# Patient Record
Sex: MALE | Race: WHITE | URBAN - METROPOLITAN AREA
[De-identification: names, ages, dates, MRNs, and addresses within clinical notes are randomized per-mention and may not be internally consistent; named-entity substitution may affect disease eponyms.]

---

## 2019-07-02 ENCOUNTER — OFFICE VISIT CONVERTED (OUTPATIENT)
Dept: OTHER | Facility: HOSPITAL | Age: 24
End: 2019-07-02
Attending: NURSE PRACTITIONER

## 2019-07-02 ENCOUNTER — CONVERSION ENCOUNTER (OUTPATIENT)
Dept: OTHER | Facility: HOSPITAL | Age: 24
End: 2019-07-02

## 2019-07-15 ENCOUNTER — HOSPITAL ENCOUNTER (OUTPATIENT)
Dept: OTHER | Facility: HOSPITAL | Age: 24
Discharge: HOME | End: 2019-07-15

## 2019-07-15 ENCOUNTER — OFFICE VISIT CONVERTED (OUTPATIENT)
Dept: OTHER | Facility: HOSPITAL | Age: 24
End: 2019-07-15
Attending: NURSE PRACTITIONER

## 2019-07-15 LAB
ALBUMIN SERPL-MCNC: 4.5 G/DL (ref 3.5–5)
ALP SERPL-CCNC: 101 U/L (ref 53–128)
ALT SERPL-CCNC: 135 U/L (ref 10–40)
ANION GAP SERPL CALC-SCNC: 22 MMOL/L (ref 8–19)
AST SERPL-CCNC: 95 U/L (ref 15–50)
BASOPHILS # BLD AUTO: 0.05 10*3/UL (ref 0–0.2)
BASOPHILS NFR BLD AUTO: 0.9 % (ref 0–3)
BILIRUB SERPL-MCNC: 0.51 MG/DL (ref 0.2–1.3)
BUN SERPL-MCNC: 13 MG/DL (ref 5–25)
BUN/CREAT SERPL: 14 {RATIO} (ref 6–20)
CALCIUM SERPL-MCNC: 9.3 MG/DL (ref 8.7–10.4)
CHLORIDE SERPL-SCNC: 101 MMOL/L (ref 99–111)
CONV ABS IMM GRAN: 0.02 10*3/UL (ref 0–0.2)
CONV BILI, CONJUGATED: <0.2 MG/DL (ref 0–0.6)
CONV CO2: 21 MMOL/L (ref 22–32)
CONV IMMATURE GRAN: 0.4 % (ref 0–1.8)
CONV TOTAL PROTEIN: 7.7 G/DL (ref 6.3–8.2)
CONV UNCONJUGATED BILIRUBIN: 0.3 MG/DL (ref 0–1.1)
CREAT UR-MCNC: 0.9 MG/DL (ref 0.7–1.2)
DEPRECATED RDW RBC AUTO: 42.2 FL (ref 35.1–43.9)
EOSINOPHIL # BLD AUTO: 0.05 10*3/UL (ref 0–0.7)
EOSINOPHIL # BLD AUTO: 0.9 % (ref 0–7)
ERYTHROCYTE [DISTWIDTH] IN BLOOD BY AUTOMATED COUNT: 12.9 % (ref 11.6–14.4)
FOLATE SERPL-MCNC: 18.5 NG/ML (ref 4.8–20)
GFR SERPLBLD BASED ON 1.73 SQ M-ARVRAT: >60 ML/MIN/{1.73_M2}
GGT SERPL-CCNC: 21 U/L (ref 8–78)
GLUCOSE SERPL-MCNC: 76 MG/DL (ref 70–99)
HBA1C MFR BLD: 14.1 G/DL (ref 14–18)
HCT VFR BLD AUTO: 42 % (ref 42–52)
LYMPHOCYTES # BLD AUTO: 2.08 10*3/UL (ref 1–5)
MCH RBC QN AUTO: 29.8 PG (ref 27–31)
MCHC RBC AUTO-ENTMCNC: 33.6 G/DL (ref 33–37)
MCV RBC AUTO: 88.8 FL (ref 80–96)
MONOCYTES # BLD AUTO: 0.57 10*3/UL (ref 0.2–1.2)
MONOCYTES NFR BLD AUTO: 10.6 % (ref 3–10)
NEUTROPHILS # BLD AUTO: 2.63 10*3/UL (ref 2–8)
NEUTROPHILS NFR BLD AUTO: 48.7 % (ref 30–85)
NRBC CBCN: 0 % (ref 0–0.7)
OSMOLALITY SERPL CALC.SUM OF ELEC: 289 MOSM/KG (ref 273–304)
PLATELET # BLD AUTO: 160 10*3/UL (ref 130–400)
PMV BLD AUTO: 10.2 FL (ref 9.4–12.4)
POTASSIUM SERPL-SCNC: 4 MMOL/L (ref 3.5–5.3)
RBC # BLD AUTO: 4.73 10*6/UL (ref 4.7–6.1)
SODIUM SERPL-SCNC: 140 MMOL/L (ref 135–147)
VALPROATE SERPL-MCNC: 69.6 UG/ML (ref 50–100)
VARIANT LYMPHS NFR BLD MANUAL: 38.5 % (ref 20–45)
VIT B12 SERPL-MCNC: 768 PG/ML (ref 211–911)
WBC # BLD AUTO: 5.4 10*3/UL (ref 4.8–10.8)

## 2019-07-17 LAB
CONV HEPATITIS B SURFACE AG W CONFIRMATION RE: NEGATIVE
HAV IGM SERPL QL IA: NEGATIVE
HBV CORE IGM SERPL QL IA: NEGATIVE
HCV AB SER DONR QL: <0.1 S/CO RATIO (ref 0–0.9)

## 2019-08-13 ENCOUNTER — OFFICE VISIT CONVERTED (OUTPATIENT)
Dept: OTHER | Facility: HOSPITAL | Age: 24
End: 2019-08-13
Attending: NURSE PRACTITIONER

## 2019-08-13 ENCOUNTER — CONVERSION ENCOUNTER (OUTPATIENT)
Dept: OTHER | Facility: HOSPITAL | Age: 24
End: 2019-08-13

## 2021-05-15 VITALS
RESPIRATION RATE: 18 BRPM | WEIGHT: 233 LBS | HEART RATE: 62 BPM | OXYGEN SATURATION: 96 % | DIASTOLIC BLOOD PRESSURE: 66 MMHG | BODY MASS INDEX: 37.45 KG/M2 | SYSTOLIC BLOOD PRESSURE: 110 MMHG | TEMPERATURE: 96.9 F | HEIGHT: 66 IN

## 2021-05-15 VITALS
OXYGEN SATURATION: 91 % | DIASTOLIC BLOOD PRESSURE: 74 MMHG | SYSTOLIC BLOOD PRESSURE: 102 MMHG | HEIGHT: 66 IN | TEMPERATURE: 96.2 F | BODY MASS INDEX: 37.04 KG/M2 | RESPIRATION RATE: 20 BRPM | WEIGHT: 230.5 LBS | HEART RATE: 67 BPM

## 2021-05-15 VITALS
BODY MASS INDEX: 37.93 KG/M2 | HEIGHT: 66 IN | RESPIRATION RATE: 18 BRPM | SYSTOLIC BLOOD PRESSURE: 110 MMHG | WEIGHT: 236 LBS | OXYGEN SATURATION: 96 % | HEART RATE: 88 BPM | TEMPERATURE: 97 F | DIASTOLIC BLOOD PRESSURE: 68 MMHG

## 2025-04-17 ENCOUNTER — HOSPITAL ENCOUNTER (EMERGENCY)
Facility: HOSPITAL | Age: 30
Discharge: HOME | End: 2025-04-17
Attending: EMERGENCY MEDICINE
Payer: MEDICAID

## 2025-04-17 ENCOUNTER — APPOINTMENT (OUTPATIENT)
Dept: CT IMAGING | Facility: HOSPITAL | Age: 30
End: 2025-04-17
Payer: MEDICAID

## 2025-04-17 VITALS
HEIGHT: 66 IN | TEMPERATURE: 98.2 F | DIASTOLIC BLOOD PRESSURE: 82 MMHG | HEART RATE: 88 BPM | RESPIRATION RATE: 20 BRPM | BODY MASS INDEX: 48.33 KG/M2 | SYSTOLIC BLOOD PRESSURE: 134 MMHG | WEIGHT: 300.71 LBS | OXYGEN SATURATION: 99 %
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
## 2025-04-17 DIAGNOSIS — S09.93XA FACIAL TRAUMA, INITIAL ENCOUNTER: Primary | ICD-10-CM

## 2025-04-17 DIAGNOSIS — R04.0 EPISTAXIS DUE TO TRAUMA: ICD-10-CM

## 2025-04-17 DIAGNOSIS — R22.0 NASAL SWELLING: ICD-10-CM

## 2025-04-17 PROCEDURE — 70486 CT MAXILLOFACIAL W/O DYE: CPT

## 2025-04-17 PROCEDURE — 99284 EMERGENCY DEPT VISIT MOD MDM: CPT

## 2025-04-17 PROCEDURE — 70450 CT HEAD/BRAIN W/O DYE: CPT

## 2025-04-17 RX ORDER — PROPRANOLOL HYDROCHLORIDE 10 MG/1
TABLET ORAL
COMMUNITY
Start: 2025-03-24

## 2025-04-17 RX ORDER — CALCIUM CARBONATE 500(1250)
TABLET ORAL
COMMUNITY
Start: 2025-03-24

## 2025-04-17 RX ORDER — QUETIAPINE FUMARATE 200 MG/1
TABLET, FILM COATED ORAL
COMMUNITY
Start: 2025-02-25

## 2025-04-17 RX ORDER — PALIPERIDONE 9 MG/1
TABLET, EXTENDED RELEASE ORAL
COMMUNITY
Start: 2025-03-24

## 2025-04-17 RX ORDER — FLUOXETINE HYDROCHLORIDE 40 MG/1
CAPSULE ORAL
COMMUNITY

## 2025-04-17 RX ORDER — CLONIDINE HYDROCHLORIDE 0.1 MG/1
TABLET ORAL
COMMUNITY
Start: 2025-03-24

## 2025-04-17 RX ORDER — DIVALPROEX SODIUM 500 MG/1
TABLET, FILM COATED, EXTENDED RELEASE ORAL
COMMUNITY
Start: 2025-03-24

## 2025-04-17 RX ORDER — QUETIAPINE FUMARATE 100 MG/1
TABLET, FILM COATED ORAL
COMMUNITY

## 2025-04-17 RX ORDER — DOCUSATE SODIUM 100 MG
CAPSULE ORAL
COMMUNITY
Start: 2025-03-24

## 2025-04-17 RX ORDER — DIAZEPAM 5 MG/1
TABLET ORAL
COMMUNITY
Start: 2025-04-07

## 2025-04-17 RX ORDER — BUSPIRONE HYDROCHLORIDE 30 MG/1
TABLET ORAL
COMMUNITY
Start: 2025-03-24

## 2025-04-17 NOTE — ED PROVIDER NOTES
Time: 6:53 PM EDT  Date of encounter:  4/17/2025  Independent Historian/Clinical History and Information was obtained by:   Patient and Family    History is limited by: N/A    Chief Complaint   Patient presents with    Fall    Facial Injury         History of Present Illness:  Patient is a 29 y.o. year old male who presents to the emergency department for evaluation of injury after a fall that occurred today.  Family reports patient tripped while entering the living room, was not able to brace himself and hit his face when he fell.  They report patient has history of of autism and is nonverbal.  Patient has swelling and abrasions to the nose.  Family denies loss of consciousness.  Patient appears to be in no obvious distress in triage.  (PRASANTH Haines, provider in triage)     On exam he does have dried blood bilaterally.  The bleeding has stopped.  It appears that he had bilateral epistaxis.  There are no septal hematomas noted on exam.  The patient is in no distress on exam.  He denies any other injuries.  He is alert not oriented and at his baseline per family.  He denies any vision changes.  He denies any nausea or vomiting.    Patient Care Team  Primary Care Provider: Juju Atkins APRN    Past Medical History:     Allergies   Allergen Reactions    Penicillin G Diarrhea     History reviewed. No pertinent past medical history.  History reviewed. No pertinent surgical history.  History reviewed. No pertinent family history.    Home Medications:  Prior to Admission medications    Not on File        Social History:          Review of Systems:  Review of Systems   Constitutional:  Negative for chills and fever.   HENT:  Positive for facial swelling and nosebleeds. Negative for congestion, drooling, ear pain, sore throat and trouble swallowing.    Eyes:  Negative for photophobia, pain and visual disturbance.   Respiratory:  Negative for cough, chest tightness and shortness of breath.    Cardiovascular:   "Negative for chest pain.   Gastrointestinal:  Negative for abdominal pain, diarrhea, nausea and vomiting.   Genitourinary:  Negative for flank pain and hematuria.   Musculoskeletal:  Negative for back pain, joint swelling, neck pain and neck stiffness.   Skin:  Positive for wound. Negative for pallor.   Neurological:  Negative for seizures and headaches.   All other systems reviewed and are negative.       Physical Exam:  /82 (BP Location: Left arm, Patient Position: Sitting)   Pulse 88   Temp 98.2 °F (36.8 °C) (Oral)   Resp 20   Ht 167.6 cm (66\")   Wt (!) 136 kg (300 lb 11.3 oz)   SpO2 99%   BMI 48.54 kg/m²         Physical Exam  Vitals and nursing note reviewed.   Constitutional:       General: He is not in acute distress.     Appearance: Normal appearance. He is not ill-appearing or toxic-appearing.   HENT:      Right Ear: Ear canal and external ear normal.      Left Ear: Ear canal and external ear normal.      Nose: Signs of injury and nasal tenderness present. No nasal deformity or septal deviation.      Right Nostril: Epistaxis present. No septal hematoma.      Left Nostril: Epistaxis present. No septal hematoma.        Mouth/Throat:      Mouth: Mucous membranes are moist.      Pharynx: Oropharynx is clear.   Eyes:      General: No scleral icterus.     Extraocular Movements: Extraocular movements intact.      Conjunctiva/sclera: Conjunctivae normal.      Pupils: Pupils are equal, round, and reactive to light.   Cardiovascular:      Rate and Rhythm: Normal rate and regular rhythm.      Pulses: Normal pulses.   Pulmonary:      Effort: Pulmonary effort is normal. No respiratory distress.      Breath sounds: Normal breath sounds.   Musculoskeletal:         General: Normal range of motion.      Cervical back: Normal range of motion and neck supple. No rigidity or tenderness.   Lymphadenopathy:      Cervical: No cervical adenopathy.   Skin:     General: Skin is warm and dry.      Capillary Refill: " Capillary refill takes less than 2 seconds.      Coloration: Skin is not cyanotic.      Comments: Small abrasion to the bridge of the nose   Neurological:      General: No focal deficit present.      Mental Status: He is alert and oriented to person, place, and time. Mental status is at baseline.   Psychiatric:         Attention and Perception: Attention and perception normal.         Mood and Affect: Mood normal.         Behavior: Behavior normal.          Medical Decision Making:      Comorbidities that affect care:    Autism,    External Notes reviewed:    Previous Clinic Note: Patient was seen at the primary care office for pharyngitis and acute cough on 12/13/2024.      The following orders were placed and all results were independently analyzed by me:  Orders Placed This Encounter   Procedures    CT Facial Bones Without Contrast    CT Head Without Contrast       Medications Given in the Emergency Department:  Medications - No data to display     ED Course:    The patient was initially evaluated in the triage area where orders were placed. The patient was later dispositioned by PRASANTH Way.      The patient was advised to stay for completion of workup which includes but is not limited to communication of labs and radiological results, reassessment and plan. The patient was advised that leaving prior to disposition by a provider could result in critical findings that are not communicated to the patient.          Labs:    Lab Results (last 24 hours)       ** No results found for the last 24 hours. **             Imaging:    CT Facial Bones Without Contrast  Result Date: 4/17/2025  CT HEAD WO CONTRAST, CT FACIAL BONES WO CONTRAST Date of Exam: 4/17/2025 7:26 PM EDT Indication: fall, head/facial injury headache. Comparison: None available. Technique: Axial CT images were obtained of the head and facial bones without contrast administration.  Reconstructed coronal and sagittal images were also obtained.  Automated exposure control and iterative construction methods were used. Findings: Gray-white matter differentiation is maintained without evidence of an acute infarction. Congenital agenesis of the corpus callosum no intracranial mass or mass effect. No extra-axial mass or collection. The ventricles and sulci are normal in size and configuration. The posterior fossa appears normal. Sellar and suprasellar structures are normal. The bony margins of the frontal sinuses are intact. The bony margins of the orbits are intact. The bony margins of the maxillary sinuses are intact. The mandible is intact. The zygomatic arches are intact. The pterygoid plates are intact. The parotid and  submandibular glands are normal. The mastoid air cells are aerated.     Impression: 1.No acute intracranial abnormality. No acute facial bone fracture. 2.Congenital agenesis of the corpus callosum. Electronically Signed: Dami Almaguer MD  4/17/2025 7:59 PM EDT  Workstation ID: [id number]    CT Head Without Contrast  Result Date: 4/17/2025  CT HEAD WO CONTRAST, CT FACIAL BONES WO CONTRAST Date of Exam: 4/17/2025 7:26 PM EDT Indication: fall, head/facial injury headache. Comparison: None available. Technique: Axial CT images were obtained of the head and facial bones without contrast administration.  Reconstructed coronal and sagittal images were also obtained. Automated exposure control and iterative construction methods were used. Findings: Gray-white matter differentiation is maintained without evidence of an acute infarction. Congenital agenesis of the corpus callosum no intracranial mass or mass effect. No extra-axial mass or collection. The ventricles and sulci are normal in size and configuration. The posterior fossa appears normal. Sellar and suprasellar structures are normal. The bony margins of the frontal sinuses are intact. The bony margins of the orbits are intact. The bony margins of the maxillary sinuses are intact. The mandible is  intact. The zygomatic arches are intact. The pterygoid plates are intact. The parotid and  submandibular glands are normal. The mastoid air cells are aerated.     Impression: 1.No acute intracranial abnormality. No acute facial bone fracture. 2.Congenital agenesis of the corpus callosum. Electronically Signed: Dami Almaguer MD  4/17/2025 7:59 PM EDT  Workstation ID: [id number]        Differential Diagnosis and Discussion:      Epistaxis: Differential diagnosis includes but is not limited to trauma, environmental exposure, coagulopathy, allergic, infectious, hypertension, foreign bodies, hormonal, and tumors.  Facial Pain/Swelling: Differential diagnosis includes but is not limited to temporal arteritis, intracranial tumors, neuralgias, dental disease, ocular disease, TMJ syndrome, salivary gland disorders, sinusitis, cluster headaches, migraines, and psychogenic.    PROCEDURES:    CT scan was performed in the emergency department and the CT scan radiology impression was interpreted by me.    No orders to display        Procedures    MDM  Number of Diagnoses or Management Options  Epistaxis due to trauma: new and requires workup  Facial trauma, initial encounter: new and requires workup  Fall, initial encounter: minor  Nasal swelling: new and requires workup     Amount and/or Complexity of Data Reviewed  Tests in the radiology section of CPT®: reviewed    Risk of Complications, Morbidity, and/or Mortality  Presenting problems: low  Diagnostic procedures: low  Management options: low    Patient Progress  Patient progress: stable     Patient Care Considerations:    ANTIBIOTICS: I considered prescribing antibiotics as an outpatient however no bacterial focus of infection was found.      Consultants/Shared Management Plan:    None    Social Determinants of Health:    Patient has presented with family members who are responsible, reliable and will ensure follow up care.      Disposition and Care Coordination:    Discharged:  The patient is suitable and stable for discharge with no need for consideration of admission.    I have explained the patient´s condition, diagnoses and treatment plan based on the information available to me at this time. I have answered questions and addressed any concerns. The patient has a good  understanding of the patient´s diagnosis, condition, and treatment plan as can be expected at this point. The vital signs have been stable. The patient´s condition is stable and appropriate for discharge from the emergency department.      The patient will pursue further outpatient evaluation with the primary care physician or other designated or consulting physician as outlined in the discharge instructions. They are agreeable to this plan of care and follow-up instructions have been explained in detail. The patient has received these instructions in written format and has expressed an understanding of the discharge instructions. The patient is aware that any significant change in condition or worsening of symptoms should prompt an immediate return to this or the closest emergency department or call to 911.  I have explained discharge medications and the need for follow up with the patient/caretakers. This was also printed in the discharge instructions. Patient was discharged with the following medications and follow up:      Medication List      No changes were made to your prescriptions during this visit.      Juju Atkins, APRN  [address] [facility] [id number] [phone]    Call   FOR FOLLOW UP       Final diagnoses:   Facial trauma, initial encounter   Epistaxis due to trauma   Nasal swelling   Fall, initial encounter        ED Disposition       ED Disposition   Discharge    Condition   Stable    Comment   --               This medical record created using voice recognition software.             Zakia Forbes, PRASANTH  04/17/25 [id number]

## 2025-04-18 NOTE — DISCHARGE INSTRUCTIONS
His CT scan of his brain and facial bones did not show any acute abnormalities.  I am sending home a nasal clip to use should he have any further nasal bleeding you may apply gentle pressure and ice and that should take care of it.  You may give over-the-counter acetaminophen and Motrin as needed for aches and pains.  Ice to areas to help with swelling and comfort over the next several days.  Call his primary care provider and advise them of his fall and injuries and follow-up with them as directed.  Return to the emergency department immediately for any acutely developing altered mental status, any unmanaged nasal bleeding with 30 minutes of gentle pressure and ice, or any new or worse concerns.